# Patient Record
Sex: FEMALE | Race: WHITE | NOT HISPANIC OR LATINO
[De-identification: names, ages, dates, MRNs, and addresses within clinical notes are randomized per-mention and may not be internally consistent; named-entity substitution may affect disease eponyms.]

---

## 2019-06-12 PROBLEM — Z00.00 ENCOUNTER FOR PREVENTIVE HEALTH EXAMINATION: Status: ACTIVE | Noted: 2019-06-12

## 2019-06-25 ENCOUNTER — APPOINTMENT (OUTPATIENT)
Dept: CARDIOLOGY | Facility: CLINIC | Age: 64
End: 2019-06-25

## 2019-07-02 ENCOUNTER — RECORD ABSTRACTING (OUTPATIENT)
Age: 64
End: 2019-07-02

## 2019-07-02 ENCOUNTER — NON-APPOINTMENT (OUTPATIENT)
Age: 64
End: 2019-07-02

## 2019-07-02 ENCOUNTER — APPOINTMENT (OUTPATIENT)
Dept: CARDIOLOGY | Facility: CLINIC | Age: 64
End: 2019-07-02
Payer: COMMERCIAL

## 2019-07-02 VITALS
DIASTOLIC BLOOD PRESSURE: 70 MMHG | SYSTOLIC BLOOD PRESSURE: 110 MMHG | HEIGHT: 61 IN | OXYGEN SATURATION: 96 % | WEIGHT: 116 LBS | HEART RATE: 74 BPM | BODY MASS INDEX: 21.9 KG/M2

## 2019-07-02 VITALS — DIASTOLIC BLOOD PRESSURE: 74 MMHG | SYSTOLIC BLOOD PRESSURE: 130 MMHG

## 2019-07-02 DIAGNOSIS — I10 ESSENTIAL (PRIMARY) HYPERTENSION: ICD-10-CM

## 2019-07-02 DIAGNOSIS — Z87.891 PERSONAL HISTORY OF NICOTINE DEPENDENCE: ICD-10-CM

## 2019-07-02 DIAGNOSIS — M41.9 SCOLIOSIS, UNSPECIFIED: ICD-10-CM

## 2019-07-02 DIAGNOSIS — E78.5 HYPERLIPIDEMIA, UNSPECIFIED: ICD-10-CM

## 2019-07-02 DIAGNOSIS — K52.9 NONINFECTIVE GASTROENTERITIS AND COLITIS, UNSPECIFIED: ICD-10-CM

## 2019-07-02 DIAGNOSIS — Z78.9 OTHER SPECIFIED HEALTH STATUS: ICD-10-CM

## 2019-07-02 DIAGNOSIS — F32.9 MAJOR DEPRESSIVE DISORDER, SINGLE EPISODE, UNSPECIFIED: ICD-10-CM

## 2019-07-02 DIAGNOSIS — R94.31 ABNORMAL ELECTROCARDIOGRAM [ECG] [EKG]: ICD-10-CM

## 2019-07-02 PROCEDURE — 93000 ELECTROCARDIOGRAM COMPLETE: CPT

## 2019-07-02 PROCEDURE — 99204 OFFICE O/P NEW MOD 45 MIN: CPT

## 2019-07-02 RX ORDER — ATORVASTATIN CALCIUM 40 MG/1
40 TABLET, FILM COATED ORAL DAILY
Refills: 0 | Status: ACTIVE | COMMUNITY

## 2019-07-02 RX ORDER — HYDROCHLOROTHIAZIDE 12.5 MG/1
12.5 TABLET ORAL DAILY
Refills: 0 | Status: ACTIVE | COMMUNITY

## 2019-07-02 RX ORDER — BUPROPION HYDROCHLORIDE 150 MG/1
150 TABLET, FILM COATED, EXTENDED RELEASE ORAL
Refills: 0 | Status: ACTIVE | COMMUNITY

## 2019-07-02 RX ORDER — BUPROPION HYDROCHLORIDE 100 MG/1
100 TABLET, FILM COATED ORAL DAILY
Refills: 0 | Status: ACTIVE | COMMUNITY

## 2019-07-02 RX ORDER — METOPROLOL SUCCINATE 100 MG/1
100 TABLET, EXTENDED RELEASE ORAL DAILY
Refills: 0 | Status: ACTIVE | COMMUNITY

## 2019-07-02 NOTE — REASON FOR VISIT
[Consultation] : a consultation regarding [Hyperlipidemia] : hyperlipidemia [Hypertension] : hypertension [FreeTextEntry1] : 63-year-old white female comes in for consultation deferred by you for hypertension and hyperlipidemia. Management. Her blood pressure is remaining high her at home in the 140s systolic range in the doctor's office in high 130s.\par She has no significant chest pain. She has stable exertional dyspnea. Does not do regular exercise, but has been active. She has no PND, orthopnea, pedal edema.\par She has no palpitation, dizziness, or syncopal episode.\par She does not having increased salt or alcohol intake,\par She's a former smoker.\par Her weight is stable.\par She does have a higher carbohydrate intake.\par She has been compliant with her medications.\par She has no history of diabetes mellitus, peripheral artery disease, rheumatic fever.\par

## 2019-07-02 NOTE — ASSESSMENT
[FreeTextEntry1] : EKG. Normal sinus rhythm. anterior STT changes suggestive of ischemia versus repolarization abnormality.\par ASCVD risk 10 years is 4.4%\par Sodium was 143, potassium 3.8, creatinine 0.6. 4.\par , HDL 77, total cholesterol 224, triglycerides 145\par

## 2019-07-02 NOTE — DISCUSSION/SUMMARY
[FreeTextEntry1] : 63-year-old white female with above medical history inactive problems as noted below.\par I reviewed labs, EKG abnormality, her the physiology of hypertension, hyperlipidemia, return.\par #1 essential hypertension. Less than 130/80 in both upper arm when checked in the office by me. Her blood pressure readings by her amchine 142\par normal renal functions.\par She does have abnormal EKG, which could be repolarization abnormality. Though with her risk factors and history of former smoking along with family history it would be important for us to assess evaluate and rule out any significant obstructive coronary artery disease.\par Exercise test. Also will tell us about another reading of resting and blood pressure at peak exercise.\par Further adjustment of medication based on that.\par I would also appreciate if she had any urinalysis for protein checked recently. There is proteinuria. Further adjustment of medication. Also, would be important, and in that case, she may benefit from ACE inhibitor or angiotensin receptor blocker.\par Low salt diet. Increasing exercise reviewed.\par #2 hyperlipidemia . On statin therapy. 10 year ASCVD, risk at present on present dosage of medication is less than 7.5%. Reviewed importance of continued aggressive lifestyle modification, which includes increasing aerobic exercises and decreasing carbohydrate intake.\par #3 essential hypertension. Normal EKG. Systolic murmur. Echocardiogram will be also done to evaluate the ejection fraction wall motion wall thickness and valvular evaluation\par \par Counseling regarding low saturated fat, salt and carbohydrate intake was reviewed. Active lifestyle and regular. Exercise along with weight management is advised.\par All the above were at length reviewed. Answered all the questions. Thank you very much for this kind referral. Please do not hesitate to give me a call for any question.\par Part of this transcription was done with voice recognition software and phonetically similar errors are common. I apologize for that. Please donot hesitate to call for any questions due to above.\par \par

## 2019-07-02 NOTE — HISTORY OF PRESENT ILLNESS
[FreeTextEntry1] : History of essential hypertension. Without any congestive heart failure. Former smoker. No renal insufficiency.\par Hyperlipidemia on atorvastatin.\par

## 2019-07-02 NOTE — PHYSICAL EXAM
[General Appearance - Well Developed] : well developed [Normal Appearance] : normal appearance [Well Groomed] : well groomed [General Appearance - Well Nourished] : well nourished [No Deformities] : no deformities [General Appearance - In No Acute Distress] : no acute distress [Normal Conjunctiva] : the conjunctiva exhibited no abnormalities [Eyelids - No Xanthelasma] : the eyelids demonstrated no xanthelasmas [Normal Oral Mucosa] : normal oral mucosa [No Oral Pallor] : no oral pallor [No Oral Cyanosis] : no oral cyanosis [Normal Jugular Venous A Waves Present] : normal jugular venous A waves present [Normal Jugular Venous V Waves Present] : normal jugular venous V waves present [No Jugular Venous Cohen A Waves] : no jugular venous cohen A waves [No Precordial Heave] : no precordial heave was noted [Normal Rate] : normal [Normal S1] : normal S1 [Normal S2] : normal S2 [S3] : no S3 [S4] : no S4 [I] : a grade 1 [Right Carotid Bruit] : no bruit heard over the right carotid [Left Carotid Bruit] : no bruit heard over the left carotid [Right Femoral Bruit] : no bruit heard over the right femoral artery [Left Femoral Bruit] : no bruit heard over the left femoral artery [2+] : left 2+ [No Abnormalities] : the abdominal aorta was not enlarged and no bruit was heard [No Pitting Edema] : no pitting edema present [Respiration, Rhythm And Depth] : normal respiratory rhythm and effort [Exaggerated Use Of Accessory Muscles For Inspiration] : no accessory muscle use [Auscultation Breath Sounds / Voice Sounds] : lungs were clear to auscultation bilaterally [Abdomen Soft] : soft [Abdomen Tenderness] : non-tender [Abdomen Mass (___ Cm)] : no abdominal mass palpated [Abnormal Walk] : normal gait [Gait - Sufficient For Exercise Testing] : the gait was sufficient for exercise testing [Nail Clubbing] : no clubbing of the fingernails [Cyanosis, Localized] : no localized cyanosis [Petechial Hemorrhages (___cm)] : no petechial hemorrhages [Skin Color & Pigmentation] : normal skin color and pigmentation [] : no rash [No Venous Stasis] : no venous stasis [Skin Lesions] : no skin lesions [No Skin Ulcers] : no skin ulcer [No Xanthoma] : no  xanthoma was observed [Oriented To Time, Place, And Person] : oriented to person, place, and time [Affect] : the affect was normal [Mood] : the mood was normal [No Anxiety] : not feeling anxious

## 2019-07-23 ENCOUNTER — APPOINTMENT (OUTPATIENT)
Dept: CARDIOLOGY | Facility: CLINIC | Age: 64
End: 2019-07-23

## 2019-08-15 ENCOUNTER — APPOINTMENT (OUTPATIENT)
Dept: CARDIOLOGY | Facility: CLINIC | Age: 64
End: 2019-08-15

## 2022-10-14 ENCOUNTER — OFFICE (OUTPATIENT)
Dept: URBAN - METROPOLITAN AREA CLINIC 8 | Facility: CLINIC | Age: 67
Setting detail: OPHTHALMOLOGY
End: 2022-10-14
Payer: MEDICARE

## 2022-10-14 DIAGNOSIS — H25.13: ICD-10-CM

## 2022-10-14 PROCEDURE — 92004 COMPRE OPH EXAM NEW PT 1/>: CPT | Performed by: OPHTHALMOLOGY

## 2022-10-14 ASSESSMENT — REFRACTION_MANIFEST
OS_VA2: 20/30(J2)
OD_SPHERE: -0.50
OS_ADD: +2.25
OD_ADD: +2.25
OD_CYLINDER: SPHERE
OD_AXIS: 105
OS_CYLINDER: -0.25
OS_SPHERE: -0.25
OD_VA2: 20/30(J2)
OD_VA2: 20/30(J2)
OD_VA1: 20/25
OS_VA1: 20/30-1
OS_CYLINDER: -0.25
OS_SPHERE: -0.50
OD_ADD: +1.25
OS_AXIS: 070
OS_VA2: 20/30(J2)
OD_SPHERE: -0.75
OS_AXIS: 070
OU_VA: 20/30-1
OS_ADD: +1.25
OD_CYLINDER: SPHERE

## 2022-10-14 ASSESSMENT — REFRACTION_CURRENTRX
OS_ADD: +1.00
OD_OVR_VA: 20/
OS_OVR_VA: 20/
OD_VPRISM_DIRECTION: SV
OD_ADD: +1.00
OS_VPRISM_DIRECTION: SV

## 2022-10-14 ASSESSMENT — AXIALLENGTH_DERIVED
OS_AL: 22.4632
OD_AL: 22.4141
OS_AL: 22.5519
OS_AL: 22.5519

## 2022-10-14 ASSESSMENT — KERATOMETRY
OS_AXISANGLE_DEGREES: 107
OS_K2POWER_DIOPTERS: 47.50
OD_AXISANGLE_DEGREES: 79
OS_K1POWER_DIOPTERS: 46.75
OD_K2POWER_DIOPTERS: 47.25
OD_K1POWER_DIOPTERS: 46.50

## 2022-10-14 ASSESSMENT — CONFRONTATIONAL VISUAL FIELD TEST (CVF)
OS_FINDINGS: FULL
OD_FINDINGS: FULL

## 2022-10-14 ASSESSMENT — VISUAL ACUITY
OS_BCVA: 20/25
OD_BCVA: 20/40-2

## 2022-10-14 ASSESSMENT — REFRACTION_AUTOREFRACTION
OS_SPHERE: -0.25
OD_AXIS: 107
OD_CYLINDER: -0.50
OS_CYLINDER: -0.75
OS_AXIS: 068
OD_SPHERE: +0.25

## 2022-10-14 ASSESSMENT — SPHEQUIV_DERIVED
OD_SPHEQUIV: 0
OS_SPHEQUIV: -0.375
OS_SPHEQUIV: -0.625
OS_SPHEQUIV: -0.625

## 2023-08-26 ENCOUNTER — EMERGENCY (EMERGENCY)
Facility: HOSPITAL | Age: 68
LOS: 1 days | Discharge: HOME | End: 2023-08-26
Attending: EMERGENCY MEDICINE | Admitting: EMERGENCY MEDICINE
Payer: MEDICARE

## 2023-08-26 VITALS
OXYGEN SATURATION: 97 % | HEART RATE: 65 BPM | DIASTOLIC BLOOD PRESSURE: 65 MMHG | TEMPERATURE: 98 F | RESPIRATION RATE: 20 BRPM | SYSTOLIC BLOOD PRESSURE: 140 MMHG

## 2023-08-26 VITALS
RESPIRATION RATE: 18 BRPM | DIASTOLIC BLOOD PRESSURE: 68 MMHG | SYSTOLIC BLOOD PRESSURE: 153 MMHG | WEIGHT: 129.41 LBS | OXYGEN SATURATION: 97 % | HEART RATE: 68 BPM | HEIGHT: 61 IN | TEMPERATURE: 98 F

## 2023-08-26 LAB
ALBUMIN SERPL ELPH-MCNC: 3.8 G/DL — SIGNIFICANT CHANGE UP (ref 3.3–5)
ALP SERPL-CCNC: 74 U/L — SIGNIFICANT CHANGE UP (ref 30–120)
ALT FLD-CCNC: 30 U/L — SIGNIFICANT CHANGE UP (ref 10–60)
ANION GAP SERPL CALC-SCNC: 7 MMOL/L — SIGNIFICANT CHANGE UP (ref 5–17)
APTT BLD: 30 SEC — SIGNIFICANT CHANGE UP (ref 24.5–35.6)
AST SERPL-CCNC: 32 U/L — SIGNIFICANT CHANGE UP (ref 10–40)
BASOPHILS # BLD AUTO: 0.02 K/UL — SIGNIFICANT CHANGE UP (ref 0–0.2)
BASOPHILS NFR BLD AUTO: 0.2 % — SIGNIFICANT CHANGE UP (ref 0–2)
BILIRUB SERPL-MCNC: 0.3 MG/DL — SIGNIFICANT CHANGE UP (ref 0.2–1.2)
BUN SERPL-MCNC: 8 MG/DL — SIGNIFICANT CHANGE UP (ref 7–23)
CALCIUM SERPL-MCNC: 9.9 MG/DL — SIGNIFICANT CHANGE UP (ref 8.4–10.5)
CHLORIDE SERPL-SCNC: 103 MMOL/L — SIGNIFICANT CHANGE UP (ref 96–108)
CO2 SERPL-SCNC: 32 MMOL/L — HIGH (ref 22–31)
CREAT SERPL-MCNC: 0.7 MG/DL — SIGNIFICANT CHANGE UP (ref 0.5–1.3)
D DIMER BLD IA.RAPID-MCNC: 221 NG/ML DDU — SIGNIFICANT CHANGE UP
EGFR: 94 ML/MIN/1.73M2 — SIGNIFICANT CHANGE UP
EOSINOPHIL # BLD AUTO: 0.01 K/UL — SIGNIFICANT CHANGE UP (ref 0–0.5)
EOSINOPHIL NFR BLD AUTO: 0.1 % — SIGNIFICANT CHANGE UP (ref 0–6)
GLUCOSE SERPL-MCNC: 110 MG/DL — HIGH (ref 70–99)
HCT VFR BLD CALC: 38.4 % — SIGNIFICANT CHANGE UP (ref 34.5–45)
HGB BLD-MCNC: 11.9 G/DL — SIGNIFICANT CHANGE UP (ref 11.5–15.5)
IMM GRANULOCYTES NFR BLD AUTO: 0.3 % — SIGNIFICANT CHANGE UP (ref 0–0.9)
INR BLD: 0.94 RATIO — SIGNIFICANT CHANGE UP (ref 0.85–1.18)
LIDOCAIN IGE QN: 36 U/L — SIGNIFICANT CHANGE UP (ref 16–77)
LYMPHOCYTES # BLD AUTO: 1.28 K/UL — SIGNIFICANT CHANGE UP (ref 1–3.3)
LYMPHOCYTES # BLD AUTO: 10.6 % — LOW (ref 13–44)
MCHC RBC-ENTMCNC: 29 PG — SIGNIFICANT CHANGE UP (ref 27–34)
MCHC RBC-ENTMCNC: 31 GM/DL — LOW (ref 32–36)
MCV RBC AUTO: 93.7 FL — SIGNIFICANT CHANGE UP (ref 80–100)
MONOCYTES # BLD AUTO: 0.64 K/UL — SIGNIFICANT CHANGE UP (ref 0–0.9)
MONOCYTES NFR BLD AUTO: 5.3 % — SIGNIFICANT CHANGE UP (ref 2–14)
NEUTROPHILS # BLD AUTO: 10.12 K/UL — HIGH (ref 1.8–7.4)
NEUTROPHILS NFR BLD AUTO: 83.5 % — HIGH (ref 43–77)
NRBC # BLD: 0 /100 WBCS — SIGNIFICANT CHANGE UP (ref 0–0)
NT-PROBNP SERPL-SCNC: 1574 PG/ML — HIGH (ref 0–125)
PLATELET # BLD AUTO: 254 K/UL — SIGNIFICANT CHANGE UP (ref 150–400)
POTASSIUM SERPL-MCNC: 3.4 MMOL/L — LOW (ref 3.5–5.3)
POTASSIUM SERPL-SCNC: 3.4 MMOL/L — LOW (ref 3.5–5.3)
PROT SERPL-MCNC: 7 G/DL — SIGNIFICANT CHANGE UP (ref 6–8.3)
PROTHROM AB SERPL-ACNC: 10.3 SEC — SIGNIFICANT CHANGE UP (ref 9.5–13)
RBC # BLD: 4.1 M/UL — SIGNIFICANT CHANGE UP (ref 3.8–5.2)
RBC # FLD: 13.5 % — SIGNIFICANT CHANGE UP (ref 10.3–14.5)
SODIUM SERPL-SCNC: 142 MMOL/L — SIGNIFICANT CHANGE UP (ref 135–145)
TROPONIN I, HIGH SENSITIVITY RESULT: 11.1 NG/L — SIGNIFICANT CHANGE UP
WBC # BLD: 12.11 K/UL — HIGH (ref 3.8–10.5)
WBC # FLD AUTO: 12.11 K/UL — HIGH (ref 3.8–10.5)

## 2023-08-26 PROCEDURE — 85610 PROTHROMBIN TIME: CPT

## 2023-08-26 PROCEDURE — 93010 ELECTROCARDIOGRAM REPORT: CPT

## 2023-08-26 PROCEDURE — 71250 CT THORAX DX C-: CPT | Mod: 26,MA

## 2023-08-26 PROCEDURE — 85379 FIBRIN DEGRADATION QUANT: CPT

## 2023-08-26 PROCEDURE — 83690 ASSAY OF LIPASE: CPT

## 2023-08-26 PROCEDURE — 84484 ASSAY OF TROPONIN QUANT: CPT

## 2023-08-26 PROCEDURE — 93005 ELECTROCARDIOGRAM TRACING: CPT

## 2023-08-26 PROCEDURE — 85025 COMPLETE CBC W/AUTO DIFF WBC: CPT

## 2023-08-26 PROCEDURE — 71250 CT THORAX DX C-: CPT | Mod: MA

## 2023-08-26 PROCEDURE — 83880 ASSAY OF NATRIURETIC PEPTIDE: CPT

## 2023-08-26 PROCEDURE — 99285 EMERGENCY DEPT VISIT HI MDM: CPT

## 2023-08-26 PROCEDURE — 36415 COLL VENOUS BLD VENIPUNCTURE: CPT

## 2023-08-26 PROCEDURE — 99285 EMERGENCY DEPT VISIT HI MDM: CPT | Mod: 25

## 2023-08-26 PROCEDURE — 71045 X-RAY EXAM CHEST 1 VIEW: CPT | Mod: 26

## 2023-08-26 PROCEDURE — 80053 COMPREHEN METABOLIC PANEL: CPT

## 2023-08-26 PROCEDURE — 71045 X-RAY EXAM CHEST 1 VIEW: CPT

## 2023-08-26 PROCEDURE — 85730 THROMBOPLASTIN TIME PARTIAL: CPT

## 2023-08-26 RX ORDER — POTASSIUM CHLORIDE 20 MEQ
40 PACKET (EA) ORAL ONCE
Refills: 0 | Status: COMPLETED | OUTPATIENT
Start: 2023-08-26 | End: 2023-08-26

## 2023-08-26 RX ORDER — ACETAMINOPHEN 500 MG
650 TABLET ORAL ONCE
Refills: 0 | Status: COMPLETED | OUTPATIENT
Start: 2023-08-26 | End: 2023-08-26

## 2023-08-26 RX ADMIN — Medication 650 MILLIGRAM(S): at 07:24

## 2023-08-26 RX ADMIN — Medication 650 MILLIGRAM(S): at 06:57

## 2023-08-26 RX ADMIN — Medication 40 MILLIEQUIVALENT(S): at 06:57

## 2023-08-26 NOTE — ED PROVIDER NOTE - OBJECTIVE STATEMENT
Patient is a 67-year-old female who presents to the emergency room with a chief complaint of difficulty taking a deep breath.  Past medical history of hypertension hyperlipidemia.  Patient reports that he went facelift with Dr. Mancuso on 824.  Drains were removed the following day.  She reports she was doing well but today she felt like she could not take a full breath.  Spoke with Dr. Mancuso was advised to come to the emergency room.  Denies any fevers chills nausea vomiting chest pain abdominal pain.  Denies any difficulty swallowing.

## 2023-08-26 NOTE — ED ADULT NURSE NOTE - NSFALLHARMRISKINTERV_ED_ALL_ED
Assistance OOB with selected safe patient handling equipment if applicable/Communicate risk of Fall with Harm to all staff, patient, and family/Encourage patient to sit up slowly, dangle for a short time, stand at bedside before walking/Provide visual cue: red socks, yellow wristband, yellow gown, etc/Reinforce activity limits and safety measures with patient and family/Review medications for side effects contributing to fall risk/Bed in lowest position, wheels locked, appropriate side rails in place/Call bell, personal items and telephone in reach/Instruct patient to call for assistance before getting out of bed/chair/stretcher/Non-slip footwear applied when patient is off stretcher/[place] to call system/Physically safe environment - no spills, clutter or unnecessary equipment/Purposeful Proactive Rounding/Room/bathroom lighting operational, light cord in reach

## 2023-08-26 NOTE — ED PROVIDER NOTE - PHYSICAL EXAMINATION
post surgery changes noted to the face in line with face lift no evidence of hematoma, healing ecchymosis, compartments of the face soft, pt speaking in full sentences with no s/s of respiratory distress

## 2023-08-26 NOTE — ED PROVIDER NOTE - NSFOLLOWUPINSTRUCTIONS_ED_ALL_ED_FT
1. Follow-up with your Primary Medical Doctor. Call Monday for prompt follow-up.  2. Return to Emergency room for any worsening or persistent pain, weakness, fever, any chest pains, increased difficulty breathing palpitations, swelling, any signs of infection, or any other concerning symptoms.  3. See attached instruction sheets for additional information, including information regarding signs and symptoms to look out for, reasons to seek immediate care and other important instructions.  4. Follow-up with the plastic surgeon, for further evaluation 1. Follow-up with your Primary Medical Doctor. Call Monday for prompt follow-up.  2. Return to Emergency room for any worsening or persistent pain, weakness, fever, any chest pains, increased difficulty breathing palpitations, swelling, any signs of infection, or any other concerning symptoms.  3. See attached instruction sheets for additional information, including information regarding signs and symptoms to look out for, reasons to seek immediate care and other important instructions.  4. Follow-up with the plastic surgeon, for further evaluation  5.  Continue to use incentive spirometer as discussed

## 2023-08-26 NOTE — ED PROVIDER NOTE - CARE PROVIDER_API CALL
Chowdhury, Temur Mohsin  [facility]  [address], [address]  [place] [address]  Phone: [phone]  Fax: [phone]  Follow Up Time:     Orlando Mancuso  Plastic Surgery  [address] [place] [address]  Phone: [phone]  Fax: [phone]  Follow Up Time:

## 2023-08-26 NOTE — ED PROVIDER NOTE - PROGRESS NOTE DETAILS
Received signout from Dr. Calhoun, she had discussed with Dr. Gross, states to discharge home after CT. Patient doing well, feeling much improved.  No acute complaints at this time.  Discussed with patient regarding labs, CT findings.  No acute complaints.  Discussed with patient regarding chest pain and shortness of breath precautions instructions, importance of close prompt follow-up with primary care, to follow-up with plastic surgeon, to return with any acute change or concerns.  Message sent to Dr. Gross.

## 2023-08-26 NOTE — ED ADULT TRIAGE NOTE - CHIEF COMPLAINT QUOTE
'I had a face and neck lift on 8/24/23 , I couldn't take a deep breath tonight , my doctor wants me to come here'.

## 2023-08-26 NOTE — ED PROVIDER NOTE - DIFFERENTIAL DIAGNOSIS
Patient presenting to the emergency room with air hunger following recent facelift.  Differential includes but not limited to possible atelectasis versus pneumonia versus PE.  Will obtain screening labs check D-dimer obtain cardiac enzymes chest x-ray EKG and monitor. Differential Diagnosis

## 2023-08-26 NOTE — ED ADULT NURSE NOTE - OBJECTIVE STATEMENT
pt presented with c/o difficulty breathing, unable to catch deep breath. s/p face and neck lift on 8/24/23.

## 2023-08-26 NOTE — ED PROVIDER NOTE - CLINICAL SUMMARY MEDICAL DECISION MAKING FREE TEXT BOX
Patient is a 67-year-old female who presents to the emergency room with a chief complaint of difficulty taking a deep breath.  Past medical history of hypertension hyperlipidemia.  Patient reports that he went facelift with Dr. Mancuso on 824.  Drains were removed the following day.  She reports she was doing well but today she felt like she could not take a full breath.  Spoke with Dr. Mancuso was advised to come to the emergency room.  Denies any fevers chills nausea vomiting chest pain abdominal pain.  Denies any difficulty swallowing. Patient presenting to the emergency room with air hunger following recent facelift.  Differential includes but not limited to possible atelectasis versus pneumonia versus PE.  Will obtain screening labs check D-dimer obtain cardiac enzymes chest x-ray EKG and monitor.  Independent review of chest x-ray reveals a severe scoliotic curve limiting interpretation.  Independent review of EKG reveals a sinus bradycardia 59 bpm.  D-dimer negative potassium 3.4 will supplement.  Patient remains comfortable speaking full sentences no signs or symptoms of hypoxia.  Will obtain CT imaging noncontrast of the chest.

## 2024-02-17 NOTE — ED PROVIDER NOTE - PATIENT PORTAL LINK FT
VTE Present on Admission, Assessment Completed on: 17-Feb-2024 11:07 You can access the FollowMyHealth Patient Portal offered by [facility] by registering at the following website: [email or web address]. By joining [employer]’s FollowMyHealth portal, you will also be able to view your health information using other applications (apps) compatible with our system.

## 2025-08-01 ENCOUNTER — OFFICE (OUTPATIENT)
Dept: URBAN - METROPOLITAN AREA CLINIC 8 | Facility: CLINIC | Age: 70
Setting detail: OPHTHALMOLOGY
End: 2025-08-01
Payer: MEDICARE

## 2025-08-01 ENCOUNTER — RX ONLY (RX ONLY)
Age: 70
End: 2025-08-01

## 2025-08-01 DIAGNOSIS — H25.11: ICD-10-CM

## 2025-08-01 DIAGNOSIS — H01.002: ICD-10-CM

## 2025-08-01 DIAGNOSIS — Q14.1: ICD-10-CM

## 2025-08-01 DIAGNOSIS — H35.54: ICD-10-CM

## 2025-08-01 DIAGNOSIS — H01.005: ICD-10-CM

## 2025-08-01 DIAGNOSIS — H01.001: ICD-10-CM

## 2025-08-01 DIAGNOSIS — H01.004: ICD-10-CM

## 2025-08-01 DIAGNOSIS — H52.4: ICD-10-CM

## 2025-08-01 PROCEDURE — 92015 DETERMINE REFRACTIVE STATE: CPT | Performed by: OPHTHALMOLOGY

## 2025-08-01 PROCEDURE — 92250 FUNDUS PHOTOGRAPHY W/I&R: CPT | Performed by: OPHTHALMOLOGY

## 2025-08-01 PROCEDURE — 92004 COMPRE OPH EXAM NEW PT 1/>: CPT | Performed by: OPHTHALMOLOGY

## 2025-08-01 ASSESSMENT — REFRACTION_MANIFEST
OD_ADD: +2.75
OD_VA1: 20/20-2
OS_VA1: 20/20-
OS_SPHERE: -1.00
OD_VA2: 20/20(J1+)
OS_VA1: 20/20-
OD_CYLINDER: -0.25
OD_VA2: 20/20(J1+)
OD_ADD: +1.75
OS_SPHERE: -0.75
OS_AXIS: 034
OU_VA: 20/20
OD_AXIS: 093
OD_AXIS: 060
OD_VA1: 20/25+
OD_CYLINDER: -0.50
OS_CYLINDER: -0.50
OU_VA: 20/20-
OS_AXIS: 080
OS_CYLINDER: -1.00
OS_VA2: 20/20(J1+)
OS_VA2: 20/20(J1+)
OD_SPHERE: -0.50
OS_ADD: +1.75
OS_ADD: +2.75
OD_SPHERE: -0.75

## 2025-08-01 ASSESSMENT — REFRACTION_AUTOREFRACTION
OD_SPHERE: -0.25
OS_AXIS: 083
OS_SPHERE: -0.50
OD_AXIS: 063
OS_CYLINDER: -0.75
OD_CYLINDER: -0.75

## 2025-08-01 ASSESSMENT — KERATOMETRY
OS_K2POWER_DIOPTERS: 47.50
OD_AXISANGLE_DEGREES: 104
METHOD_AUTO_MANUAL: AUTO
OD_K2POWER_DIOPTERS: 47.75
OD_K1POWER_DIOPTERS: 46.50
OS_K1POWER_DIOPTERS: 47.00
OS_AXISANGLE_DEGREES: 083

## 2025-08-01 ASSESSMENT — REFRACTION_CURRENTRX
OD_SPHERE: -0.75
OS_SPHERE: -0.75
OS_AXIS: 034
OD_OVR_VA: 20/
OS_OVR_VA: 20/
OD_CYLINDER: -0.50
OS_CYLINDER: -0.50
OD_AXIS: 093

## 2025-08-01 ASSESSMENT — VISUAL ACUITY
OD_BCVA: 20/30-2
OS_BCVA: 20/25-

## 2025-08-01 ASSESSMENT — LID EXAM ASSESSMENTS
OS_BLEPHARITIS: LLL LUL 2+
OD_BLEPHARITIS: RLL RUL 2+

## 2025-08-01 ASSESSMENT — CONFRONTATIONAL VISUAL FIELD TEST (CVF)
OS_FINDINGS: FULL
OD_FINDINGS: FULL